# Patient Record
(demographics unavailable — no encounter records)

---

## 2025-04-02 NOTE — PHYSICAL EXAM
[No Acute Distress] : no acute distress [Well Nourished] : well nourished [Well Developed] : well developed [Normal Oropharynx] : normal oropharynx [II] : Mallampati Class: II [Normal Appearance] : normal appearance [Supple] : supple [No Neck Mass] : no neck mass [No JVD] : no jvd [Normal Rate/Rhythm] : normal rate/rhythm [Normal S1, S2] : normal s1, s2 [No Murmurs] : no murmurs [No Resp Distress] : no resp distress [No Acc Muscle Use] : no acc muscle use [Clear to Auscultation Bilaterally] : clear to auscultation bilaterally [No Abnormalities] : no abnormalities [Normal Gait] : normal gait [No Clubbing] : no clubbing [No Cyanosis] : no cyanosis [No Edema] : no edema [FROM] : FROM [Normal Color/ Pigmentation] : normal color/ pigmentation [No Focal Deficits] : no focal deficits [Oriented x3] : oriented x3 [Normal Affect] : normal affect

## 2025-04-02 NOTE — HISTORY OF PRESENT ILLNESS
[Never] : never [TextBox_4] : CHIEF COMPLAINT: Preoperative pulmonary optimization prior to undergoing bariatric surgery.  PATIENT SUMMARY:  The patient is an individual who presented with a request for a preoperative checkup for bariatric surgery.  HISTORY OF PRESENT ILLNESS: 31 years old non-smoker no prior pulmonary disease came to establish care.  The patient presented for a preoperative checkup prior to a second bariatric surgery, scheduled for April 28th. The patient previously underwent a gastric sleeve procedure, which was not entirely successful in achieving the desired weight loss. Additionally, the patient experienced a hernia. The patient reported no use of medications like Wegovy or Zepbound for weight loss. The patient also did not report any lung issues such as asthma, COPD, or sleep apnea, and there was no history of breathing problems under general anesthesia previously. The patient did not report coughing up blood, tuberculosis, or significant COVID-19 infections. The patient was able to walk without shortness of breath and did not use inhalers.  PAST MEDICAL HISTORY: -- Gastric sleeve surgery - Hernia  PAST SURGICAL HISTORY: - Gastric sleeve surgery (date unspecified)  ALLERGIES:  No known allergies.  SOCIAL HISTORY:  - Employment: Works as a manager at [employer] - Alcohol Use: Not specified - Smoking: Non-smoker, no use of vaping or e-cigarettes - Marital Status:   FAMILY HISTORY: -- No family history of pulmonary problems such as asthma or lung cancer.  REVIEW OF SYSTEMS:  Respiratory: Positive for clear lung sounds. Negative for asthma, COPD, sleep apnea, coughing up blood, and tuberculosis.  Surgeon Dr. Craig

## 2025-04-02 NOTE — HISTORY OF PRESENT ILLNESS
[Never] : never [TextBox_4] : CHIEF COMPLAINT: Preoperative pulmonary optimization prior to undergoing bariatric surgery.  PATIENT SUMMARY:  The patient is an individual who presented with a request for a preoperative checkup for bariatric surgery.  HISTORY OF PRESENT ILLNESS: 31 years old non-smoker no prior pulmonary disease came to establish care.  The patient presented for a preoperative checkup prior to a second bariatric surgery, scheduled for April 28th. The patient previously underwent a gastric sleeve procedure, which was not entirely successful in achieving the desired weight loss. Additionally, the patient experienced a hernia. The patient reported no use of medications like Wegovy or Zepbound for weight loss. The patient also did not report any lung issues such as asthma, COPD, or sleep apnea, and there was no history of breathing problems under general anesthesia previously. The patient did not report coughing up blood, tuberculosis, or significant COVID-19 infections. The patient was able to walk without shortness of breath and did not use inhalers.  PAST MEDICAL HISTORY: -- Gastric sleeve surgery - Hernia  PAST SURGICAL HISTORY: - Gastric sleeve surgery (date unspecified)  ALLERGIES:  No known allergies.  SOCIAL HISTORY:  - Employment: Works as a manager at [employer] - Alcohol Use: Not specified - Smoking: Non-smoker, no use of vaping or e-cigarettes - Marital Status:   FAMILY HISTORY: -- No family history of pulmonary problems such as asthma or lung cancer.  REVIEW OF SYSTEMS:  Respiratory: Positive for clear lung sounds. Negative for asthma, COPD, sleep apnea, coughing up blood, and tuberculosis.  Surgeon Dr. Craig

## 2025-04-02 NOTE — CONSULT LETTER
[Dear  ___] : Dear  [unfilled], [Consult Letter:] : I had the pleasure of evaluating your patient, [unfilled]. [Please see my note below.] : Please see my note below. [Consult Closing:] : Thank you very much for allowing me to participate in the care of this patient.  If you have any questions, please do not hesitate to contact me. [Sincerely,] : Sincerely, [FreeTextEntry1] : Patient is optimized to proceed for surgery  from pulmonary and no further testing is required from pulmonary standpoint. [FreeTextEntry3] : Khushboo Jarquin MD MPH DipABLM  FACP FCCP [facility]  Faculty pulmonary critical care medicine , [address] [address] t Telephone [phone] [DrTrace  ___] : Dr. NULL

## 2025-04-02 NOTE — CONSULT LETTER
[Dear  ___] : Dear  [unfilled], [Consult Letter:] : I had the pleasure of evaluating your patient, [unfilled]. [Please see my note below.] : Please see my note below. [Consult Closing:] : Thank you very much for allowing me to participate in the care of this patient.  If you have any questions, please do not hesitate to contact me. [Sincerely,] : Sincerely, [FreeTextEntry1] : Patient is optimized to proceed for surgery  from pulmonary and no further testing is required from pulmonary standpoint. [FreeTextEntry3] : Khushboo Jarquin MD MPH DipABLM  FACP FCCP [facility]  Faculty pulmonary critical care medicine , [address] [address] t Telephone [phone] [DrTrace  ___] : Dr. NULL

## 2025-04-02 NOTE — ASSESSMENT
[FreeTextEntry1] :  31 years old female second bariatric surgery came for preoperative pulmonary optimization prior to undergoing bariatric surgery. No previous anesthesia related problems. She does not have any history of pulmonary disease like obstructive airway disease, interstitial lung disease, pulmonary vascular disease and denies prior history of pulmonary embolism. She does not have evidence of ALLY.  Based on my assessment ARISCAT score, agustin scoew  shows low risk of perioperative pulmonary complications after undergoing bariatric surgery under regional regional or general anesthesia. No further testing required from pulmonary standpoint prior to undergoing the procedure.   ASSESSMENT:  1. Preoperative Evaluation for Bariatric Surgery: The patient was undergoing evaluation for a second bariatric surgery due to insufficient weight loss from a previous gastric sleeve procedure. This is the primary focus of the current evaluation.  3. Pulmonary Health: The patient's pulmonary health was assessed to ensure suitability for surgery. The absence of pulmonary symptoms and the presence of clear lung sounds indicated no pulmonary contraindications to surgery.  PLAN:  Treatment: - The patient was cleared for surgery based on the current evaluation, with no need for further pulmonary interventions.   usual periprocedural pulmonary care includes. Aggressive DVT prophylaxis. Head of bed elevation to prevent aspiration. Incentive spirometry starting from preoperative and Continue to postoperative. early ambulation and adequate pain relief.  Patient Education: - The patient was advised on the importance of breathing exercises to strengthen lung function before and after surgery.  Follow-Up: - The patient was instructed to follow up in six months post-surgery to assess outcomes and lung function.  Disposition: - The patient is  optimized from pulmonary standpoint to proceed for upcoming bariatric surgery

## 2025-06-11 NOTE — HISTORY OF PRESENT ILLNESS
[FreeTextEntry1] : chest pain and dysphagia.  egd to r/o marginal ulcer, possible stent went to ed a few days ago with inability to tolerte PO